# Patient Record
Sex: MALE | Race: WHITE
[De-identification: names, ages, dates, MRNs, and addresses within clinical notes are randomized per-mention and may not be internally consistent; named-entity substitution may affect disease eponyms.]

---

## 2022-06-30 ENCOUNTER — HOSPITAL ENCOUNTER (EMERGENCY)
Dept: HOSPITAL 75 - ER FS | Age: 57
Discharge: HOME | End: 2022-06-30
Payer: COMMERCIAL

## 2022-06-30 VITALS — SYSTOLIC BLOOD PRESSURE: 164 MMHG | DIASTOLIC BLOOD PRESSURE: 81 MMHG

## 2022-06-30 VITALS — HEIGHT: 70 IN | BODY MASS INDEX: 31.47 KG/M2 | WEIGHT: 219.8 LBS

## 2022-06-30 DIAGNOSIS — F43.0: Primary | ICD-10-CM

## 2022-06-30 DIAGNOSIS — Z20.822: ICD-10-CM

## 2022-06-30 LAB
ALBUMIN SERPL-MCNC: 4.6 GM/DL (ref 3.2–4.5)
ALP SERPL-CCNC: 41 U/L (ref 40–136)
ALT SERPL-CCNC: 57 U/L (ref 0–55)
APTT BLD: 28 SEC (ref 24–35)
BASOPHILS # BLD AUTO: 0 10^3/UL (ref 0–0.1)
BASOPHILS NFR BLD AUTO: 1 % (ref 0–10)
BILIRUB SERPL-MCNC: 0.5 MG/DL (ref 0.1–1)
BUN/CREAT SERPL: 10
CALCIUM SERPL-MCNC: 9.1 MG/DL (ref 8.5–10.1)
CHLORIDE SERPL-SCNC: 101 MMOL/L (ref 98–107)
CO2 SERPL-SCNC: 21 MMOL/L (ref 21–32)
CREAT SERPL-MCNC: 0.87 MG/DL (ref 0.6–1.3)
EOSINOPHIL # BLD AUTO: 0 10^3/UL (ref 0–0.3)
EOSINOPHIL NFR BLD AUTO: 0 % (ref 0–10)
GFR SERPLBLD BASED ON 1.73 SQ M-ARVRAT: 99 ML/MIN
GLUCOSE SERPL-MCNC: 142 MG/DL (ref 70–105)
HCT VFR BLD CALC: 42 % (ref 40–54)
HGB BLD-MCNC: 14.5 G/DL (ref 13.3–17.7)
INR PPP: 0.9 (ref 0.8–1.4)
LIPASE SERPL-CCNC: 59 U/L (ref 8–78)
LYMPHOCYTES # BLD AUTO: 2.1 10^3/UL (ref 1–4)
LYMPHOCYTES NFR BLD AUTO: 26 % (ref 12–44)
MAGNESIUM SERPL-MCNC: 2 MG/DL (ref 1.6–2.4)
MANUAL DIFFERENTIAL PERFORMED BLD QL: NO
MCH RBC QN AUTO: 31 PG (ref 25–34)
MCHC RBC AUTO-ENTMCNC: 35 G/DL (ref 32–36)
MCV RBC AUTO: 89 FL (ref 80–99)
MONOCYTES # BLD AUTO: 0.5 10^3/UL (ref 0–1)
MONOCYTES NFR BLD AUTO: 7 % (ref 0–12)
NEUTROPHILS # BLD AUTO: 5.3 10^3/UL (ref 1.8–7.8)
NEUTROPHILS NFR BLD AUTO: 66 % (ref 42–75)
PLATELET # BLD: 297 10^3/UL (ref 130–400)
PMV BLD AUTO: 9.7 FL (ref 9–12.2)
POTASSIUM SERPL-SCNC: 3.8 MMOL/L (ref 3.6–5)
PROT SERPL-MCNC: 7.6 GM/DL (ref 6.4–8.2)
PROTHROMBIN TIME: 12.7 SEC (ref 12.2–14.7)
SODIUM SERPL-SCNC: 142 MMOL/L (ref 135–145)
WBC # BLD AUTO: 8 10^3/UL (ref 4.3–11)

## 2022-06-30 PROCEDURE — 83735 ASSAY OF MAGNESIUM: CPT

## 2022-06-30 PROCEDURE — 93041 RHYTHM ECG TRACING: CPT

## 2022-06-30 PROCEDURE — 85730 THROMBOPLASTIN TIME PARTIAL: CPT

## 2022-06-30 PROCEDURE — 84484 ASSAY OF TROPONIN QUANT: CPT

## 2022-06-30 PROCEDURE — 87636 SARSCOV2 & INF A&B AMP PRB: CPT

## 2022-06-30 PROCEDURE — 36415 COLL VENOUS BLD VENIPUNCTURE: CPT

## 2022-06-30 PROCEDURE — 93005 ELECTROCARDIOGRAM TRACING: CPT

## 2022-06-30 PROCEDURE — 83874 ASSAY OF MYOGLOBIN: CPT

## 2022-06-30 PROCEDURE — 83690 ASSAY OF LIPASE: CPT

## 2022-06-30 PROCEDURE — 85025 COMPLETE CBC W/AUTO DIFF WBC: CPT

## 2022-06-30 PROCEDURE — 85610 PROTHROMBIN TIME: CPT

## 2022-06-30 PROCEDURE — 71045 X-RAY EXAM CHEST 1 VIEW: CPT

## 2022-06-30 PROCEDURE — 80053 COMPREHEN METABOLIC PANEL: CPT

## 2022-06-30 PROCEDURE — 83880 ASSAY OF NATRIURETIC PEPTIDE: CPT

## 2022-06-30 NOTE — DIAGNOSTIC IMAGING REPORT
INDICATION: Chest pain.



FINDINGS: Portable chest. The lungs are clear. Heart is not

enlarged. No pneumothorax or pleural effusion. No bony

abnormalities.



IMPRESSION: Normal portable chest.



Dictated by: 



  Dictated on workstation # [id number]

## 2022-06-30 NOTE — ED CHEST PAIN
General


Chief Complaint:  Chest Pain


Stated Complaint:  CHEST PAIN





History of Present Illness


Date Seen by Provider:  Jun 30, 2022


Time Seen by Provider:  07:12


Initial Comments


56 yo male presents with mild right sided chest pain/pressure.  pt reports sta

rted around 2am this morning.  pt reports that normally he walks 3 miles daily 

after work but over the last couple days, just hasnt felt good and been able to 

do it.  pt with mild cough, mild sob.   denies 

nausea/fever/chills/diarrhea/vomiting, radiation of pain or diaphoresis





Allergies and Home Medications


Allergies


Coded Allergies:  


     No Known Drug Allergies (Unverified , 6/30/22)





Patient Home Medication List


Home Medication List Reviewed:  Yes





Review of Systems


Review of Systems


Constitutional:  No chills, No diaphoresis, No dizziness, No fever


Respiratory:  Cough, SOA With Exertion


Cardiovascular:  Chest Pain (right sided )


Gastrointestinal:  Denies Abdominal Pain, Denies Diarrhea, Denies Nausea, Denies

Vomiting


Genitourinary:  No Symptoms Reported


Musculoskeletal:  no symptoms reported


Skin:  no symptoms reported


Psychiatric/Neurological:  No Symptoms Reported


Endocrine:  No Symptoms Reported





Physical Exam


Vital Signs





Vital Signs - First Documented








 6/30/22





 07:18


 


Temp 36.1


 


Pulse 84


 


Resp 20


 


B/P (MAP) 158/90 (112)


 


Pulse Ox 95


 


O2 Delivery Room Air





Capillary Refill :


Height, Weight, BMI


Height: '"


Weight: lbs. oz. kg;  BMI


Method:


General Appearance:  No Apparent Distress, WD/WN


HEENT:  PERRL/EOMI, Moist Mucous Membranes


Neck:  Non Tender, Supple


Respiratory:  Lungs Clear, Normal Breath Sounds


Cardiovascular:  Regular Rate, Rhythm, No Edema


Gastrointestinal:  Non Tender, Soft


Extremity:  Normal Range of Motion, Non Tender


Neurologic/Psychiatric:  Oriented x3, No Motor/Sensory Deficits, Normal 

Mood/Affect, CNs II-XII Norm as Tested


Skin:  Normal Color, Warm/Dry





Progress/Results/Core Measures


Results/Orders


Lab Results





Laboratory Tests








Test


 6/30/22


07:07 6/30/22


07:30 Range/Units


 


 


White Blood Count


 8.0 


 


 4.3-11.0


10^3/uL


 


Red Blood Count


 4.74 


 


 4.30-5.52


10^6/uL


 


Hemoglobin 14.5   13.3-17.7  g/dL


 


Hematocrit 42   40-54  %


 


Mean Corpuscular Volume 89   80-99  fL


 


Mean Corpuscular Hemoglobin 31   25-34  pg


 


Mean Corpuscular Hemoglobin


Concent 35 


 


 32-36  g/dL





 


Red Cell Distribution Width 13.1   10.0-14.5  %


 


Platelet Count


 297 


 


 130-400


10^3/uL


 


Mean Platelet Volume 9.7   9.0-12.2  fL


 


Immature Granulocyte % (Auto) 0    %


 


Neutrophils (%) (Auto) 66   42-75  %


 


Lymphocytes (%) (Auto) 26   12-44  %


 


Monocytes (%) (Auto) 7   0-12  %


 


Eosinophils (%) (Auto) 0   0-10  %


 


Basophils (%) (Auto) 1   0-10  %


 


Neutrophils # (Auto)


 5.3 


 


 1.8-7.8


10^3/uL


 


Lymphocytes # (Auto)


 2.1 


 


 1.0-4.0


10^3/uL


 


Monocytes # (Auto)


 0.5 


 


 0.0-1.0


10^3/uL


 


Eosinophils # (Auto)


 0.0 


 


 0.0-0.3


10^3/uL


 


Basophils # (Auto)


 0.0 


 


 0.0-0.1


10^3/uL


 


Immature Granulocyte # (Auto)


 0.0 


 


 0.0-0.1


10^3/uL


 


Prothrombin Time 12.7   12.2-14.7  SEC


 


INR Comment 0.9   0.8-1.4  


 


Activated Partial


Thromboplast Time 28 


 


 24-35  SEC





 


Sodium Level 142   135-145  MMOL/L


 


Potassium Level 3.8   3.6-5.0  MMOL/L


 


Chloride Level 101     MMOL/L


 


Carbon Dioxide Level 21   21-32  MMOL/L


 


Anion Gap 20 H  5-14  MMOL/L


 


Blood Urea Nitrogen 9   7-18  MG/DL


 


Creatinine


 0.87 


 


 0.60-1.30


MG/DL


 


Estimat Glomerular Filtration


Rate 99 


 


  





 


BUN/Creatinine Ratio 10    


 


Glucose Level 142 H    MG/DL


 


Calcium Level 9.1   8.5-10.1  MG/DL


 


Corrected Calcium    8.5-10.1  MG/DL


 


Magnesium Level 2.0   1.6-2.4  MG/DL


 


Total Bilirubin 0.5   0.1-1.0  MG/DL


 


Aspartate Amino Transf


(AST/SGOT) 62 H


 


 5-34  U/L





 


Alanine Aminotransferase


(ALT/SGPT) 57 H


 


 0-55  U/L





 


Alkaline Phosphatase 41     U/L


 


Myoglobin


 < 21.0 


 


 10.0-92.0


NG/ML


 


Troponin I < 0.30   <0.30  NG/ML


 


Pro-B-Type Natriuretic Peptide 27.1   <75.0  PG/ML


 


Total Protein 7.6   6.4-8.2  GM/DL


 


Albumin 4.6 H  3.2-4.5  GM/DL


 


Lipase 59   8-78  U/L


 


SARS-CoV-2 RNA (RT-PCR)  Not Detected  Not Detecte  








My Orders





Orders - NOLAN,EDINSON L DO


Cbc With Automated Diff (6/30/22 07:12)


Magnesium (6/30/22 07:12)


Chest 1 View Ap/Pa Only (6/30/22 07:12)


Ekg Tracing (6/30/22 07:12)


Comprehensive Metabolic Panel (6/30/22 07:12)


Myoglobin Serum (6/30/22 07:12)


Protime With Inr (6/30/22 07:12)


Partial Thromboplastin Time (6/30/22 07:12)


Monitor-Rhythm Ecg Trace Only (6/30/22 07:12)


Lipid Panel (7/1/22 06:00)


Aspirin Chewable Tablet (Baby Aspirin Ch (6/30/22 07:15)


Ed Iv/Invasive Line Start (6/30/22 07:12)


Lipase (6/30/22 07:12)


Troponin I Fs (6/30/22 07:12)


Probnp Fs (6/30/22 07:12)


Covid 19 Inhouse Test (6/30/22 07:12)





Medications Given in ED





Current Medications








 Medications  Dose


 Ordered  Sig/Matt


 Route  Start Time


 Stop Time Status Last Admin


Dose Admin


 


 Aspirin  324 mg  ONCE  ONCE


 PO  6/30/22 07:15


 6/30/22 07:16 DC 6/30/22 07:27


324 MG








Vital Signs/I&O











 6/30/22





 07:18


 


Temp 36.1


 


Pulse 84


 


Resp 20


 


B/P (MAP) 158/90 (112)


 


Pulse Ox 95


 


O2 Delivery Room Air











Progress


Progress Note :  


Progress Note


Patient with negative EKG, troponin, chest x-ray.  Patient admits to being under

a significant amount of stress to start about 3 days ago due to family issue.  

Reports that that is when all his pain and everything started.  It is spent 

quite a time visit with him as he is also having some difficulty sleeping due to

this.  I suspect that his symptoms are related to situational stress.  This time

is been over 6 hours since his discomfort started with a negative troponin 

negative EKG shows very unlikely coronary syndrome.  Patient will reach out to 

his primary care provider or mental health or [employer] if he feels like he needs 

some further help with his family situation.  Patient stable and discharged home





Diagnostic Imaging





   Diagonstic Imaging:  Xray


   Plain Films/CT/US/NM/MRI:  chest


Comments


Date of Exam:06/30/22





CHEST 1 VIEW AP/PA ONLY








INDICATION: Chest pain.





FINDINGS: Portable chest. The lungs are clear. Heart is not


enlarged. No pneumothorax or pleural effusion. No bony


abnormalities.





IMPRESSION: Normal portable chest.


   Reviewed:  Reviewed by Me, Reviewed/Discussed





Departure


Impression





   Primary Impression:  


   Acute reaction to situational stress


Disposition:  01 HOME, SELF-CARE


Condition:  Stable





Departure-Patient Inst.


Patient Instructions:  Stress, Chest Pain That Is Not Caused by the Heart (DC), 

Tips on Positive Thinking





Add. Discharge Instructions:  


Please follow-up with your primary care provider or mental health if you need 

further outpatient help


Return to the ER with any concerns


You may try 50 of Benadryl at night to help with your sleep or over-the-counter 

melatonin





All discharge instructions reviewed with patient and/or family. Voiced 

understanding.











EDINSON NOLAN DO               Jun 30, 2022 07:12

## 2022-07-08 ENCOUNTER — HOSPITAL ENCOUNTER (EMERGENCY)
Dept: HOSPITAL 75 - ER FS | Age: 57
Discharge: HOME | End: 2022-07-08
Payer: COMMERCIAL

## 2022-07-08 VITALS — HEIGHT: 65.75 IN | WEIGHT: 220.46 LBS | BODY MASS INDEX: 35.86 KG/M2

## 2022-07-08 VITALS — DIASTOLIC BLOOD PRESSURE: 71 MMHG | SYSTOLIC BLOOD PRESSURE: 133 MMHG

## 2022-07-08 DIAGNOSIS — F41.1: Primary | ICD-10-CM

## 2022-07-08 DIAGNOSIS — R55: ICD-10-CM

## 2022-07-08 LAB
ALBUMIN SERPL-MCNC: 4.3 GM/DL (ref 3.2–4.5)
ALP SERPL-CCNC: 37 U/L (ref 40–136)
ALT SERPL-CCNC: 65 U/L (ref 0–55)
BASOPHILS # BLD AUTO: 0 10^3/UL (ref 0–0.1)
BASOPHILS NFR BLD AUTO: 1 % (ref 0–10)
BILIRUB SERPL-MCNC: 0.4 MG/DL (ref 0.1–1)
BUN/CREAT SERPL: 8
CALCIUM SERPL-MCNC: 8.5 MG/DL (ref 8.5–10.1)
CHLORIDE SERPL-SCNC: 100 MMOL/L (ref 98–107)
CO2 SERPL-SCNC: 24 MMOL/L (ref 21–32)
CREAT SERPL-MCNC: 0.73 MG/DL (ref 0.6–1.3)
EOSINOPHIL # BLD AUTO: 0.1 10^3/UL (ref 0–0.3)
EOSINOPHIL NFR BLD AUTO: 3 % (ref 0–10)
GFR SERPLBLD BASED ON 1.73 SQ M-ARVRAT: 105 ML/MIN
GLUCOSE SERPL-MCNC: 198 MG/DL (ref 70–105)
HCT VFR BLD CALC: 39 % (ref 40–54)
HGB BLD-MCNC: 13.4 G/DL (ref 13.3–17.7)
LYMPHOCYTES # BLD AUTO: 1.4 10^3/UL (ref 1–4)
LYMPHOCYTES NFR BLD AUTO: 35 % (ref 12–44)
MANUAL DIFFERENTIAL PERFORMED BLD QL: NO
MCH RBC QN AUTO: 31 PG (ref 25–34)
MCHC RBC AUTO-ENTMCNC: 34 G/DL (ref 32–36)
MCV RBC AUTO: 89 FL (ref 80–99)
MONOCYTES # BLD AUTO: 0.6 10^3/UL (ref 0–1)
MONOCYTES NFR BLD AUTO: 14 % (ref 0–12)
NEUTROPHILS # BLD AUTO: 1.9 10^3/UL (ref 1.8–7.8)
NEUTROPHILS NFR BLD AUTO: 48 % (ref 42–75)
PLATELET # BLD: 134 10^3/UL (ref 130–400)
PMV BLD AUTO: 9.3 FL (ref 9–12.2)
POTASSIUM SERPL-SCNC: 3.3 MMOL/L (ref 3.6–5)
PROT SERPL-MCNC: 7.2 GM/DL (ref 6.4–8.2)
SODIUM SERPL-SCNC: 144 MMOL/L (ref 135–145)
WBC # BLD AUTO: 4 10^3/UL (ref 4.3–11)

## 2022-07-08 PROCEDURE — 93005 ELECTROCARDIOGRAM TRACING: CPT

## 2022-07-08 PROCEDURE — 71045 X-RAY EXAM CHEST 1 VIEW: CPT

## 2022-07-08 PROCEDURE — 80053 COMPREHEN METABOLIC PANEL: CPT

## 2022-07-08 PROCEDURE — 36415 COLL VENOUS BLD VENIPUNCTURE: CPT

## 2022-07-08 PROCEDURE — 83880 ASSAY OF NATRIURETIC PEPTIDE: CPT

## 2022-07-08 PROCEDURE — 84484 ASSAY OF TROPONIN QUANT: CPT

## 2022-07-08 PROCEDURE — 85025 COMPLETE CBC W/AUTO DIFF WBC: CPT

## 2022-07-08 NOTE — DIAGNOSTIC IMAGING REPORT
Portable erect AP chest at 10:00 AM.



INDICATION:  Chest pain 



COMPARISON: 06/30/2022



FINDINGS: The heart size is stable when compared to the prior

exam. The lungs are clear. There is no evidence for failure,

pneumonia or for a pleural effusion. The mediastinum is not

widened. The osseous structures are intact. The deformity of the

proximal left clavicle seen previously is again evident and no

different.



IMPRESSION: There is no evidence for active disease.   



Dictated by: 



  Dictated on workstation # [id number]

## 2022-07-08 NOTE — ED CHEST PAIN
General


Chief Complaint:  Chest Pain


Stated Complaint:  CHEST PAINS


Nursing Triage Note:  


Patient has been brought to ER by EMS with cc of having a syncopal episode while




in the shower.  Patient reports that he had taken his normal 2 mile walk before 


work and while taking a shower he passed out.   He reports that after passing 


out his face was numb and he was having stabbing chest pain.  He reports the 


chest has been ongong for the last week.  He states that he has not been 


sleeping well for the last 6 months.  He reports being seen in the ER last week 


for the chest pain. He has not seen his doctor for the ongoing chest pain.  He 


has been taking aspirin for his pain but it has not helped much.


Source:  patient


Exam Limitations:  no limitations





History of Present Illness


Date Seen by Provider:  Jul 8, 2022


Time Seen by Provider:  10:15


Initial Comments


Patient is a 57-year-old male who presents to the emergency department with 

intermittent parasternal chest pain for the past several days and syncopal 

episode while in the shower.  Patient states he took his blood pressure this 

morning and then went on his usual 2 mile walk.  He denies experiencing chest 

pain during the walk but reports feeling anxious and feeling numbness around his

face.  He then proceeded to take a shower and had a brief syncopal episode with 

no LOC for few seconds.  He denies hitting his head, headache, palpitations or 

chest pain prior to episode.  After passing out patient reports numbness and 

sharp chest pain.  Patient was given nitroglycerin and aspirin by EMS.  Chest 

pain is subsided but he still remains anxious.  He denies history of coronary 

disease, arrhythmia or exertional chest pain.  He states he is under significant

stress related to a close family member who struggles with addiction.  Denies 

drugs or alcohol and is a non-smoker.  No other acute symptoms or complaints.


Timing/Duration:  1 hour


Severity/Quality:  mild


Location:  substernal, central


Radiation:  other


Prior CP/Workup:  other


Modifying Factors:  improves with other





Allergies and Home Medications


Allergies


Coded Allergies:  


     No Known Drug Allergies (Unverified , 6/30/22)





Patient Home Medication List


Home Medication List Reviewed:  Yes





Review of Systems


Review of Systems


Constitutional:  see HPI


EENTM:  See HPI


Respiratory:  See HPI


Cardiovascular:  See HPI


Gastrointestinal:  See HPI


Genitourinary:  See HPI


Musculoskeletal:  see HPI


Skin:  see HPI


Psychiatric/Neurological:  See HPI


Endocrine:  See HPI


Hematologic/Lymphatic:  See HPI





All Other Systems Reviewed


Negative Unless Noted:  Yes





Past Medical-Social-Family Hx


Patient Social History


Tobacco Use?:  No


Substance use?:  No


Alcohol Use?:  Yes


Alcohol Frequency:  Once in a while


Pt feels they are or have been:  No





Past Medical History


Surgery/Hospitalization HX:  


HTN; Depression





Physical Exam


Vital Signs





Vital Signs - First Documented








 7/8/22





 09:57


 


Temp 36.4


 


Pulse 86


 


Resp 16


 


B/P (MAP) 134/79 (97)


 


Pulse Ox 95


 


O2 Delivery Room Air





Capillary Refill :


Height, Weight, BMI


Height: '"


Weight: lbs. oz. kg; 35.00 BMI


Method:


General Appearance:  No Apparent Distress, WD/WN, Anxious


HEENT:  PERRL/EOMI, TMs Normal


Neck:  Full Range of Motion, Normal Inspection


Respiratory:  Lungs Clear, Normal Breath Sounds


Cardiovascular:  Regular Rate, Rhythm, Normal Peripheral Pulses


Gastrointestinal:  Non Tender, Soft


Neurologic/Psychiatric:  Alert, Oriented x3, No Motor/Sensory Deficits, Normal 

Mood/Affect, CNs II-XII Norm as Tested





Focused Exam


Sepsis Stage:  Ruled Out





Progress/Results/Core Measures


Results/Orders


Lab Results





Laboratory Tests








Test


 7/8/22


09:48 7/8/22


11:41 Range/Units


 


 


White Blood Count


 4.0 L


 


 4.3-11.0


10^3/uL


 


Red Blood Count


 4.40 


 


 4.30-5.52


10^6/uL


 


Hemoglobin 13.4   13.3-17.7  g/dL


 


Hematocrit 39 L  40-54  %


 


Mean Corpuscular Volume 89   80-99  fL


 


Mean Corpuscular Hemoglobin 31   25-34  pg


 


Mean Corpuscular Hemoglobin


Concent 34 


 


 32-36  g/dL





 


Red Cell Distribution Width 13.8   10.0-14.5  %


 


Platelet Count


 134 


 


 130-400


10^3/uL


 


Mean Platelet Volume 9.3   9.0-12.2  fL


 


Immature Granulocyte % (Auto) 1    %


 


Neutrophils (%) (Auto) 48   42-75  %


 


Lymphocytes (%) (Auto) 35   12-44  %


 


Monocytes (%) (Auto) 14 H  0-12  %


 


Eosinophils (%) (Auto) 3   0-10  %


 


Basophils (%) (Auto) 1   0-10  %


 


Neutrophils # (Auto)


 1.9 


 


 1.8-7.8


10^3/uL


 


Lymphocytes # (Auto)


 1.4 


 


 1.0-4.0


10^3/uL


 


Monocytes # (Auto)


 0.6 


 


 0.0-1.0


10^3/uL


 


Eosinophils # (Auto)


 0.1 


 


 0.0-0.3


10^3/uL


 


Basophils # (Auto)


 0.0 


 


 0.0-0.1


10^3/uL


 


Immature Granulocyte # (Auto)


 0.0 


 


 0.0-0.1


10^3/uL


 


Sodium Level 144   135-145  MMOL/L


 


Potassium Level 3.3 L  3.6-5.0  MMOL/L


 


Chloride Level 100     MMOL/L


 


Carbon Dioxide Level 24   21-32  MMOL/L


 


Anion Gap 20 H  5-14  MMOL/L


 


Blood Urea Nitrogen 6 L  7-18  MG/DL


 


Creatinine


 0.73 


 


 0.60-1.30


MG/DL


 


Estimat Glomerular Filtration


Rate 105 


 


  





 


BUN/Creatinine Ratio 8    


 


Glucose Level 198 H    MG/DL


 


Calcium Level 8.5   8.5-10.1  MG/DL


 


Corrected Calcium 8.3 L  8.5-10.1  MG/DL


 


Total Bilirubin 0.4   0.1-1.0  MG/DL


 


Aspartate Amino Transf


(AST/SGOT) 51 H


 


 5-34  U/L





 


Alanine Aminotransferase


(ALT/SGPT) 65 H


 


 0-55  U/L





 


Alkaline Phosphatase 37 L    U/L


 


Troponin I < 0.30  < 0.30  <0.30  NG/ML


 


Pro-B-Type Natriuretic Peptide < 5.0   <125.0  PG/ML


 


Total Protein 7.2   6.4-8.2  GM/DL


 


Albumin 4.3   3.2-4.5  GM/DL








My Orders





Orders - TAMMIE TURNER DO


Cbc With Automated Diff (7/8/22 09:57)


Comprehensive Metabolic Panel (7/8/22 09:57)


Ekg Tracing (7/8/22 09:57)


Chest 1 View Ap/Pa Only (7/8/22 09:57)


Troponin I Fs (7/8/22 09:57)


Lorazepam Injection (Ativan Injection) (7/8/22 10:15)


Ns Iv 1000 Ml (Sodium Chloride 0.9%) (7/8/22 10:15)


Probnp Fs (7/8/22 09:57)


Troponin I Fs (7/8/22 11:09)





Medications Given in ED





Current Medications








 Medications  Dose


 Ordered  Sig/Matt


 Route  Start Time


 Stop Time Status Last Admin


Dose Admin


 


 Lorazepam  1 mg  ONCE  ONCE


 IVP  7/8/22 10:15


 7/8/22 10:16 DC 7/8/22 10:17


1 MG








Vital Signs/I&O











 7/8/22





 09:57


 


Temp 36.4


 


Pulse 86


 


Resp 16


 


B/P (MAP) 134/79 (97)


 


Pulse Ox 95


 


O2 Delivery Room Air














Blood Pressure Mean:                    97











Departure


Communication (Admissions)


Chest x-ray: No acute cardiopulmonary disease per radiology report.


EKG: Sinus rhythm, no acute ST-T wave changes.





Patient with atypical chest pain starting after a brief syncopal episode in the 

shower.  Chest pain is consistent with anxiety with perioral numbness.  Patient 

is under increased anxiety and has had difficulty sleeping the past few days.  

He denies exertional chest pain palpitations or shortness of breath.  Repeat 

troponin and EKG are nonacute.  Patient did receive Ativan with resolution of 

his facial numbness in the ED.  He is currently symptom-free.  Syncope is likely

multifactorial related to mild dehydration, vasodilation from heat exposure 

exacerbated while taking a shower in addition to his home blood pressure 

medication.  Vital signs are stable in the ED.  Comfortable discharging the 

patient home with anxiety medications with instructions to follow-up with his 

PCP for additional outpatient screening and for community mental health 

services.  This discharge plan discussed with the patient in detail who is 

comfortable with recommendations.  Return precautions reviewed.  Patient 

verbalizes understanding agreement discharge instructions prior to departure.





Impression





   Primary Impression:  


   Anxiety state


   Additional Impressions:  


   Chest pain


   Syncope


Disposition:  01 HOME, SELF-CARE


Condition:  Stable





Departure-Patient Inst.


Decision time for Depature:  12:15


Referrals:  


NO,LOCAL PHYSICIAN (PCP)


Primary Care Physician


Patient Instructions:  Anxiety, Adult ED, Syncope (Fainting) (DC), Chest Pain, 

Adult ED





Add. Discharge Instructions:  


You were evaluated in the emergency department for fainting spell, chest pain 

and anxiety.  The exact cause of your symptoms has not been determined.  Please 

avoid taking blood pressure medication prior to exercise and shower, and 

increase daily fluid intake.  Hold daily blood pressure medication if you feel 

dizzy or lightheaded and take newly prescribed anxiety medication as directed.  

Follow-up with your PCP for reevaluation and coordination of further outpatient 

testing.  Do not take anxiety medication if driving or performing any potential 

dangerous activity.  Return to the ED if new or worsening symptoms.





All discharge instructions reviewed with patient and/or family. Voiced 

understanding.


Scripts


Hydroxyzine HCl (Hydroxyzine HCl) 50 Mg Tablet


50 MG PO Q6H, #10 TAB


   Prov: TAMMIE TURNER DO         7/8/22











TAMMIE TURNER DO                    Jul 8, 2022 10:36

## 2024-07-11 ENCOUNTER — APPOINTMENT (OUTPATIENT)
Facility: HOSPITAL | Age: 59
End: 2024-07-11
Payer: COMMERCIAL

## 2024-07-11 ENCOUNTER — HOSPITAL ENCOUNTER (EMERGENCY)
Facility: HOSPITAL | Age: 59
Discharge: HOME | End: 2024-07-11
Attending: STUDENT IN AN ORGANIZED HEALTH CARE EDUCATION/TRAINING PROGRAM
Payer: COMMERCIAL

## 2024-07-11 VITALS
OXYGEN SATURATION: 98 % | DIASTOLIC BLOOD PRESSURE: 79 MMHG | HEIGHT: 71 IN | BODY MASS INDEX: 30.1 KG/M2 | HEART RATE: 90 BPM | RESPIRATION RATE: 18 BRPM | WEIGHT: 215 LBS | TEMPERATURE: 98.7 F | SYSTOLIC BLOOD PRESSURE: 150 MMHG

## 2024-07-11 DIAGNOSIS — R07.9 CHEST PAIN, UNSPECIFIED TYPE: Primary | ICD-10-CM

## 2024-07-11 LAB
ALBUMIN SERPL-MCNC: 4 G/DL (ref 3.5–5)
ALBUMIN/GLOB SERPL: 1.3 (ref 1.1–2.2)
ALP SERPL-CCNC: 44 U/L (ref 45–117)
ALT SERPL-CCNC: 74 U/L (ref 12–78)
ANION GAP SERPL CALC-SCNC: 10 MMOL/L (ref 5–15)
AST SERPL W P-5'-P-CCNC: 78 U/L (ref 15–37)
BASOPHILS # BLD: 0 K/UL (ref 0–0.1)
BASOPHILS NFR BLD: 0 % (ref 0–1)
BILIRUB SERPL-MCNC: 1.4 MG/DL (ref 0.2–1)
BUN SERPL-MCNC: 19 MG/DL (ref 6–20)
BUN/CREAT SERPL: 15 (ref 12–20)
CA-I BLD-MCNC: 8.6 MG/DL (ref 8.5–10.1)
CHLORIDE SERPL-SCNC: 100 MMOL/L (ref 97–108)
CO2 SERPL-SCNC: 29 MMOL/L (ref 21–32)
CREAT SERPL-MCNC: 1.23 MG/DL (ref 0.7–1.3)
DIFFERENTIAL METHOD BLD: ABNORMAL
EKG ATRIAL RATE: 87 BPM
EKG DIAGNOSIS: NORMAL
EKG P AXIS: 27 DEGREES
EKG P-R INTERVAL: 126 MS
EKG Q-T INTERVAL: 390 MS
EKG QRS DURATION: 106 MS
EKG QTC CALCULATION (BAZETT): 469 MS
EKG R AXIS: 17 DEGREES
EKG T AXIS: 15 DEGREES
EKG VENTRICULAR RATE: 87 BPM
EOSINOPHIL # BLD: 0 K/UL (ref 0–0.4)
EOSINOPHIL NFR BLD: 0 % (ref 0–7)
ERYTHROCYTE [DISTWIDTH] IN BLOOD BY AUTOMATED COUNT: 13 % (ref 11.5–14.5)
GLOBULIN SER CALC-MCNC: 3.2 G/DL (ref 2–4)
GLUCOSE SERPL-MCNC: 153 MG/DL (ref 65–100)
HCT VFR BLD AUTO: 40.3 % (ref 36.6–50.3)
HGB BLD-MCNC: 14.3 G/DL (ref 12.1–17)
IMM GRANULOCYTES # BLD AUTO: 0.1 K/UL (ref 0–0.04)
IMM GRANULOCYTES NFR BLD AUTO: 1 % (ref 0–0.5)
LYMPHOCYTES # BLD: 1.4 K/UL (ref 0.8–3.5)
LYMPHOCYTES NFR BLD: 13 % (ref 12–49)
MCH RBC QN AUTO: 31 PG (ref 26–34)
MCHC RBC AUTO-ENTMCNC: 35.5 G/DL (ref 30–36.5)
MCV RBC AUTO: 87.4 FL (ref 80–99)
MONOCYTES # BLD: 1.1 K/UL (ref 0–1)
MONOCYTES NFR BLD: 10 % (ref 5–13)
NEUTS SEG # BLD: 8.9 K/UL (ref 1.8–8)
NEUTS SEG NFR BLD: 76 % (ref 32–75)
NRBC # BLD: 0 K/UL (ref 0–0.01)
NRBC BLD-RTO: 0 PER 100 WBC
PLATELET # BLD AUTO: 182 K/UL (ref 150–400)
PMV BLD AUTO: 10.1 FL (ref 8.9–12.9)
POTASSIUM SERPL-SCNC: 3.2 MMOL/L (ref 3.5–5.1)
PROT SERPL-MCNC: 7.2 G/DL (ref 6.4–8.2)
RBC # BLD AUTO: 4.61 M/UL (ref 4.1–5.7)
SODIUM SERPL-SCNC: 139 MMOL/L (ref 136–145)
TROPONIN I SERPL HS-MCNC: 13 NG/L (ref 0–76)
WBC # BLD AUTO: 11.5 K/UL (ref 4.1–11.1)

## 2024-07-11 PROCEDURE — 99285 EMERGENCY DEPT VISIT HI MDM: CPT

## 2024-07-11 PROCEDURE — 84484 ASSAY OF TROPONIN QUANT: CPT

## 2024-07-11 PROCEDURE — 71046 X-RAY EXAM CHEST 2 VIEWS: CPT

## 2024-07-11 PROCEDURE — 96375 TX/PRO/DX INJ NEW DRUG ADDON: CPT

## 2024-07-11 PROCEDURE — 80053 COMPREHEN METABOLIC PANEL: CPT

## 2024-07-11 PROCEDURE — 93005 ELECTROCARDIOGRAM TRACING: CPT | Performed by: STUDENT IN AN ORGANIZED HEALTH CARE EDUCATION/TRAINING PROGRAM

## 2024-07-11 PROCEDURE — 36415 COLL VENOUS BLD VENIPUNCTURE: CPT

## 2024-07-11 PROCEDURE — 85025 COMPLETE CBC W/AUTO DIFF WBC: CPT

## 2024-07-11 PROCEDURE — 96374 THER/PROPH/DIAG INJ IV PUSH: CPT

## 2024-07-11 PROCEDURE — 6360000002 HC RX W HCPCS: Performed by: STUDENT IN AN ORGANIZED HEALTH CARE EDUCATION/TRAINING PROGRAM

## 2024-07-11 PROCEDURE — 6370000000 HC RX 637 (ALT 250 FOR IP): Performed by: STUDENT IN AN ORGANIZED HEALTH CARE EDUCATION/TRAINING PROGRAM

## 2024-07-11 RX ORDER — KETOROLAC TROMETHAMINE 15 MG/ML
15 INJECTION, SOLUTION INTRAMUSCULAR; INTRAVENOUS
Status: COMPLETED | OUTPATIENT
Start: 2024-07-11 | End: 2024-07-11

## 2024-07-11 RX ORDER — KETOROLAC TROMETHAMINE 10 MG/1
10 TABLET, FILM COATED ORAL EVERY 6 HOURS PRN
Qty: 20 TABLET | Refills: 0 | Status: SHIPPED | OUTPATIENT
Start: 2024-07-11

## 2024-07-11 RX ORDER — LORAZEPAM 2 MG/ML
1 INJECTION INTRAMUSCULAR ONCE
Status: DISCONTINUED | OUTPATIENT
Start: 2024-07-11 | End: 2024-07-11

## 2024-07-11 RX ORDER — ONDANSETRON 2 MG/ML
4 INJECTION INTRAMUSCULAR; INTRAVENOUS EVERY 6 HOURS PRN
Status: DISCONTINUED | OUTPATIENT
Start: 2024-07-11 | End: 2024-07-11 | Stop reason: HOSPADM

## 2024-07-11 RX ORDER — LORAZEPAM 1 MG/1
1 TABLET ORAL EVERY 4 HOURS PRN
Status: DISCONTINUED | OUTPATIENT
Start: 2024-07-11 | End: 2024-07-11 | Stop reason: HOSPADM

## 2024-07-11 RX ADMIN — LORAZEPAM 1 MG: 1 TABLET ORAL at 03:50

## 2024-07-11 RX ADMIN — KETOROLAC TROMETHAMINE 15 MG: 15 INJECTION, SOLUTION INTRAMUSCULAR; INTRAVENOUS at 02:25

## 2024-07-11 RX ADMIN — ONDANSETRON 4 MG: 2 INJECTION INTRAMUSCULAR; INTRAVENOUS at 03:50

## 2024-07-11 ASSESSMENT — PAIN SCALES - GENERAL
PAINLEVEL_OUTOF10: 3
PAINLEVEL_OUTOF10: 6

## 2024-07-11 ASSESSMENT — PAIN - FUNCTIONAL ASSESSMENT: PAIN_FUNCTIONAL_ASSESSMENT: 0-10

## 2024-07-11 ASSESSMENT — PAIN DESCRIPTION - LOCATION: LOCATION: CHEST

## 2024-07-11 NOTE — ED NOTES
Assumed care of patient. Patient sitting up in chair. No acute distress noted. Patient complaining of mild chest and back pain, denies SOB.

## 2024-07-11 NOTE — ED PROVIDER NOTES
[facility] EMERGENCY DEPT  EMERGENCY DEPARTMENT HISTORY AND PHYSICAL EXAM      Date: 7/11/2024  Patient Name: Marcin Alejandre  MRN: [id number]  Birthdate 1965  Date of evaluation: 7/11/2024  Provider: Juan Cortés MD   Note Started: 2:22 AM EDT 7/11/24    HISTORY OF PRESENT ILLNESS     Chief Complaint   Patient presents with    Chest Pain    Emesis       History Provided By: Patient    HPI: Marcin Alejandre is a 59 y.o. male PMH HTN presenting with chest pain that started tonight and nausea vomiting for the last 2 days.  Patient reports midepigastric pain which is stabbing in nature does not radiate.  No history of heart disease or MI.  No recent abdominal surgeries.  Patient does report previous cholecystectomy.  Patient was given aspirin full dose with EMS and 4 mg of Zofran.  He still reports excepted pain and mild nausea.  He denies any similar chest pain in the past.    PAST MEDICAL HISTORY   Past Medical History:  Past Medical History:   Diagnosis Date    Hypertension        Past Surgical History:  History reviewed. No pertinent surgical history.    Family History:  History reviewed. No pertinent family history.    Social History:  Social History     Tobacco Use    Smoking status: Never    Smokeless tobacco: Never   Substance Use Topics    Alcohol use: Yes     Comment: socially    Drug use: Not Currently       Allergies:  Not on File    PCP: Neda Aden MD    Current Meds:   Current Facility-Administered Medications   Medication Dose Route Frequency Provider Last Rate Last Admin    ondansetron (ZOFRAN) injection 4 mg  4 mg IntraVENous Q6H PRN Juan Cortés MD   4 mg at 07/11/24 0350    LORazepam (ATIVAN) tablet 1 mg  1 mg Oral Q4H PRN Juan Cortés MD   1 mg at 07/11/24 0350     Current Outpatient Medications   Medication Sig Dispense Refill    ketorolac (TORADOL) 10 MG tablet Take 1 tablet by mouth every 6 hours as needed for Pain 20 tablet 0       Social Determinants of Health:   Social

## 2024-07-11 NOTE — ED TRIAGE NOTES
Pt arrives via EMS from home for vomiting x2 days and chest pain that started tonight. EMS gave 324 aspirin and 4 zofran.

## 2024-07-11 NOTE — DISCHARGE INSTRUCTIONS
It was a please treating you today Marcin Alejandre. You were evaluated for chest pain in the Emergency Department today. Your lab work (cardiac enzymes), chest x-ray, and EKG were within normal limits. There was no evidence you were having an acute cardiac event today (such as a heart attack), but I would like you to follow up with cardiology for further evaluation. Please schedule an appointment to be seen in the next week for re-evaluation and continued care. Some forms of early heart disease would not be detected on your visit today and you may require a stress test for further evaluation if you have not had one before. A cardiologist can also discuss the best heart, blood pressure, and cholestrol medication to be on to help reduce your risk factors for any cardiac events. While your work up was reassuring today, you may still have a cardiac event in the future. If you have any worsening chest pain, chest pressure, shortness of breath, difficulty breathing, or any concern you may be having a heart attack you must return to the Emergency Department or call 911 immediately.         Thank you for choosing our Emergency Department for your care.  It is our privilege to care for you in your time of need.  In the next several days, you may receive a survey via email or mailed to your home about your experience with our team.  We would greatly appreciate you taking a few minutes to complete the survey, as we use this information to learn what we have done well and what we could be doing better. Thank you for trusting us with your care!    Below you will find a list of your tests from today's visit.   Labs  Recent Results (from the past 12 hour(s))   CBC with Auto Differential    Collection Time: 07/11/24  2:01 AM   Result Value Ref Range    WBC 11.5 (H) 4.1 - 11.1 K/uL    RBC 4.61 4.10 - 5.70 M/uL    Hemoglobin 14.3 12.1 - 17.0 g/dL    Hematocrit 40.3 36.6 - 50.3 %    MCV 87.4 80.0 - 99.0 FL    MCH 31.0 26.0 - 34.0 PG

## 2025-01-08 ENCOUNTER — HOSPITAL ENCOUNTER (EMERGENCY)
Facility: HOSPITAL | Age: 60
Discharge: HOME | End: 2025-01-09
Attending: EMERGENCY MEDICINE

## 2025-01-08 ENCOUNTER — APPOINTMENT (OUTPATIENT)
Facility: HOSPITAL | Age: 60
End: 2025-01-08

## 2025-01-08 DIAGNOSIS — F10.10 ALCOHOL ABUSE: Primary | ICD-10-CM

## 2025-01-08 LAB
ALBUMIN SERPL-MCNC: 3.6 G/DL (ref 3.5–5)
ALBUMIN/GLOB SERPL: 1.2 (ref 1.1–2.2)
ALP SERPL-CCNC: 42 U/L (ref 45–117)
ALT SERPL-CCNC: 61 U/L (ref 12–78)
AMMONIA PLAS-SCNC: 77 UMOL/L
ANION GAP SERPL CALC-SCNC: 12 MMOL/L (ref 2–12)
AST SERPL W P-5'-P-CCNC: 40 U/L (ref 15–37)
BASOPHILS # BLD: 0.03 K/UL (ref 0–0.1)
BASOPHILS NFR BLD: 0.5 % (ref 0–1)
BILIRUB SERPL-MCNC: 0.7 MG/DL (ref 0.2–1)
BUN SERPL-MCNC: 11 MG/DL (ref 6–20)
BUN/CREAT SERPL: 13 (ref 12–20)
CA-I BLD-MCNC: 8.4 MG/DL (ref 8.5–10.1)
CHLORIDE SERPL-SCNC: 100 MMOL/L (ref 97–108)
CO2 SERPL-SCNC: 29 MMOL/L (ref 21–32)
CREAT SERPL-MCNC: 0.82 MG/DL (ref 0.7–1.3)
DIFFERENTIAL METHOD BLD: ABNORMAL
EOSINOPHIL # BLD: 0 K/UL (ref 0–0.4)
EOSINOPHIL NFR BLD: 0 % (ref 0–7)
ERYTHROCYTE [DISTWIDTH] IN BLOOD BY AUTOMATED COUNT: 12.6 % (ref 11.5–14.5)
ETHANOL SERPL-MCNC: 43 MG/DL (ref 0–0.08)
FLUAV RNA SPEC QL NAA+PROBE: NOT DETECTED
FLUBV RNA SPEC QL NAA+PROBE: NOT DETECTED
GLOBULIN SER CALC-MCNC: 3 G/DL (ref 2–4)
GLUCOSE SERPL-MCNC: 188 MG/DL (ref 65–100)
HCT VFR BLD AUTO: 40.4 % (ref 36.6–50.3)
HGB BLD-MCNC: 14.3 G/DL (ref 12.1–17)
IMM GRANULOCYTES # BLD AUTO: 0.02 K/UL (ref 0–0.04)
IMM GRANULOCYTES NFR BLD AUTO: 0.3 % (ref 0–0.5)
LYMPHOCYTES # BLD: 0.88 K/UL (ref 0.8–3.5)
LYMPHOCYTES NFR BLD: 14.4 % (ref 12–49)
MCH RBC QN AUTO: 30.8 PG (ref 26–34)
MCHC RBC AUTO-ENTMCNC: 35.4 G/DL (ref 30–36.5)
MCV RBC AUTO: 86.9 FL (ref 80–99)
MONOCYTES # BLD: 0.52 K/UL (ref 0–1)
MONOCYTES NFR BLD: 8.5 % (ref 5–13)
NEUTS SEG # BLD: 4.67 K/UL (ref 1.8–8)
NEUTS SEG NFR BLD: 76.3 % (ref 32–75)
NRBC # BLD: 0 K/UL (ref 0–0.01)
NRBC BLD-RTO: 0 PER 100 WBC
PLATELET # BLD AUTO: 217 K/UL (ref 150–400)
PMV BLD AUTO: 9.9 FL (ref 8.9–12.9)
POTASSIUM SERPL-SCNC: 3.2 MMOL/L (ref 3.5–5.1)
PROT SERPL-MCNC: 6.6 G/DL (ref 6.4–8.2)
RBC # BLD AUTO: 4.65 M/UL (ref 4.1–5.7)
SARS-COV-2 RNA RESP QL NAA+PROBE: NOT DETECTED
SODIUM SERPL-SCNC: 141 MMOL/L (ref 136–145)
TROPONIN I SERPL HS-MCNC: 6 NG/L (ref 0–76)
WBC # BLD AUTO: 6.1 K/UL (ref 4.1–11.1)

## 2025-01-08 PROCEDURE — 87636 SARSCOV2 & INF A&B AMP PRB: CPT

## 2025-01-08 PROCEDURE — 99284 EMERGENCY DEPT VISIT MOD MDM: CPT

## 2025-01-08 PROCEDURE — 80053 COMPREHEN METABOLIC PANEL: CPT

## 2025-01-08 PROCEDURE — 93005 ELECTROCARDIOGRAM TRACING: CPT | Performed by: EMERGENCY MEDICINE

## 2025-01-08 PROCEDURE — 82077 ASSAY SPEC XCP UR&BREATH IA: CPT

## 2025-01-08 PROCEDURE — 36415 COLL VENOUS BLD VENIPUNCTURE: CPT

## 2025-01-08 PROCEDURE — 70450 CT HEAD/BRAIN W/O DYE: CPT

## 2025-01-08 PROCEDURE — 84484 ASSAY OF TROPONIN QUANT: CPT

## 2025-01-08 PROCEDURE — 85025 COMPLETE CBC W/AUTO DIFF WBC: CPT

## 2025-01-08 PROCEDURE — 82140 ASSAY OF AMMONIA: CPT

## 2025-01-08 ASSESSMENT — PAIN SCALES - GENERAL
PAINLEVEL_OUTOF10: 0

## 2025-01-08 ASSESSMENT — LIFESTYLE VARIABLES
HOW MANY STANDARD DRINKS CONTAINING ALCOHOL DO YOU HAVE ON A TYPICAL DAY: 7 TO 9
HOW OFTEN DO YOU HAVE A DRINK CONTAINING ALCOHOL: 4 OR MORE TIMES A WEEK

## 2025-01-08 NOTE — ED TRIAGE NOTES
Patient arrives from home for DT's patients last drink was 24hrs ago patient is lethargic and altered

## 2025-01-09 VITALS
TEMPERATURE: 98 F | WEIGHT: 210 LBS | BODY MASS INDEX: 29.4 KG/M2 | OXYGEN SATURATION: 97 % | DIASTOLIC BLOOD PRESSURE: 108 MMHG | HEART RATE: 98 BPM | RESPIRATION RATE: 10 BRPM | HEIGHT: 71 IN | SYSTOLIC BLOOD PRESSURE: 164 MMHG

## 2025-01-09 LAB
EKG ATRIAL RATE: 88 BPM
EKG DIAGNOSIS: NORMAL
EKG P AXIS: 8 DEGREES
EKG P-R INTERVAL: 166 MS
EKG Q-T INTERVAL: 374 MS
EKG QRS DURATION: 90 MS
EKG QTC CALCULATION (BAZETT): 452 MS
EKG R AXIS: -1 DEGREES
EKG T AXIS: 7 DEGREES
EKG VENTRICULAR RATE: 88 BPM

## 2025-01-09 RX ORDER — ONDANSETRON 4 MG/1
4 TABLET, ORALLY DISINTEGRATING ORAL EVERY 8 HOURS PRN
Qty: 20 TABLET | Refills: 0 | Status: SHIPPED | OUTPATIENT
Start: 2025-01-09

## 2025-01-09 RX ORDER — LACTULOSE 10 G/10G
10 SOLUTION ORAL 2 TIMES DAILY
Qty: 10 PACKET | Refills: 0 | Status: SHIPPED | OUTPATIENT
Start: 2025-01-09

## 2025-01-09 NOTE — ED NOTES
Daughter called at this time, requesting update. Update provided at this time. Phone provided to patient for patient to speak with patient.   Daughter informed writer that patient has been vomiting and having diarrhea at home quite frequently here recently.

## 2025-01-09 NOTE — ED PROVIDER NOTES
[facility] EMERGENCY DEPT  EMERGENCY DEPARTMENT HISTORY AND PHYSICAL EXAM      Date: 1/8/2025  Patient Name: Marcin Alejandre  MRN: [id number]  Birthdate 1965  Date of evaluation: 1/8/2025  Provider: Bradley Adkins MD   Note Started: 7:32 PM EST 1/8/25    HISTORY OF PRESENT ILLNESS     Chief Complaint   Patient presents with    Delirium Tremens (DTS)       History Provided By: Patient and EMS    HPI: Marcin Alejandre is a 59 y.o. male presents for evaluation of confusion somnolence.  EMS states they were called for the same patient was found to 60s concern for going under DTs as his last drink was 24 hours ago.  Patient denies any specific somatic complaints at this time.    PAST MEDICAL HISTORY   Past Medical History:  Past Medical History:   Diagnosis Date    Hypertension        Past Surgical History:  No past surgical history on file.    Family History:  No family history on file.    Social History:  Social History     Tobacco Use    Smoking status: Never    Smokeless tobacco: Never   Substance Use Topics    Alcohol use: Yes     Comment: socially    Drug use: Not Currently       Allergies:  No Known Allergies    PCP: Neda Aden MD    Current Meds:   No current facility-administered medications for this encounter.     Current Outpatient Medications   Medication Sig Dispense Refill    lactulose (CEPHULAC) 10 g packet Take 1 packet by mouth 2 times daily 10 packet 0    ondansetron (ZOFRAN-ODT) 4 MG disintegrating tablet Take 1 tablet by mouth every 8 hours as needed for Nausea or Vomiting 20 tablet 0    ketorolac (TORADOL) 10 MG tablet Take 1 tablet by mouth every 6 hours as needed for Pain 20 tablet 0       Social Determinants of Health:   Social Determinants of Health     Tobacco Use: Low Risk  (7/11/2024)    Patient History     Smoking Tobacco Use: Never     Smokeless Tobacco Use: Never     Passive Exposure: Not on file   Alcohol Use: Alcohol Misuse (1/8/2025)    AUDIT-C     Frequency of Alcohol

## 2025-01-09 NOTE — ED NOTES
Pt's linens replaced due to pt tossing and turning. Placed in new gown and new electrodes applied.

## 2025-01-09 NOTE — DISCHARGE INSTRUCTIONS
Thank you for choosing our Emergency [facility] for your care.  It is our privilege to care for you in your time of need.  In the next several days, you may receive a survey via email or mailed to your home about your experience with our team.  We would greatly appreciate you taking a few minutes to complete the survey, as we use this information to learn what we have done well and what we could be doing better. Thank you for trusting us with your care!    Below you will find a list of your tests from today's visit.   Labs and Radiology Studies  Recent Results (from the past 12 hour(s))   CBC with Auto Differential    Collection Time: 01/08/25  5:33 PM   Result Value Ref Range    WBC 6.1 4.1 - 11.1 K/uL    RBC 4.65 4.10 - 5.70 M/uL    Hemoglobin 14.3 12.1 - 17.0 g/dL    Hematocrit 40.4 36.6 - 50.3 %    MCV 86.9 80.0 - 99.0 FL    MCH 30.8 26.0 - 34.0 PG    MCHC 35.4 30.0 - 36.5 g/dL    RDW 12.6 11.5 - 14.5 %    Platelets 217 150 - 400 K/uL    MPV 9.9 8.9 - 12.9 FL    Nucleated RBCs 0.0 0.0  WBC    nRBC 0.00 0.00 - 0.01 K/uL    Neutrophils % 76.3 (H) 32.0 - 75.0 %    Lymphocytes % 14.4 12.0 - 49.0 %    Monocytes % 8.5 5.0 - 13.0 %    Eosinophils % 0.0 0.0 - 7.0 %    Basophils % 0.5 0.0 - 1.0 %    Immature Granulocytes % 0.3 0 - 0.5 %    Neutrophils Absolute 4.67 1.80 - 8.00 K/UL    Lymphocytes Absolute 0.88 0.80 - 3.50 K/UL    Monocytes Absolute 0.52 0.00 - 1.00 K/UL    Eosinophils Absolute 0.00 0.00 - 0.40 K/UL    Basophils Absolute 0.03 0.00 - 0.10 K/UL    Immature Granulocytes Absolute 0.02 0.00 - 0.04 K/UL    Differential Type AUTOMATED     Comprehensive Metabolic Panel    Collection Time: 01/08/25  5:33 PM   Result Value Ref Range    Sodium 141 136 - 145 mmol/L    Potassium 3.2 (L) 3.5 - 5.1 mmol/L    Chloride 100 97 - 108 mmol/L    CO2 29 21 - 32 mmol/L    Anion Gap 12 2 - 12 mmol/L    Glucose 188 (H) 65 - 100 mg/dL    BUN 11 6 - 20 mg/dL    Creatinine 0.82 0.70 - 1.30 mg/dL    BUN/Creatinine Ratio 13

## 2025-01-09 NOTE — ED NOTES
Pt presents soiled in feces at arrival. Incontinence care provided at this time. External urinary catheter applied.

## 2025-01-09 NOTE — ED NOTES
Assumed care of pt at this time. Pt resting comfortably on stomach in stretcher. Pt on continuous cardiac monitoring, pulse ox, and blood pressure monitoring at this time.

## 2025-01-09 NOTE — ED NOTES
Pt pulling himself off the monitor and climbing out of bed. Pt able to be redirected back to bed when asked. Pt able to tell staff the month and name, but still unable to state where he is or why he came in. Provider notified of findings.

## 2025-04-28 ENCOUNTER — OFFICE VISIT (OUTPATIENT)
Age: 60
End: 2025-04-28
Payer: COMMERCIAL

## 2025-04-28 VITALS — WEIGHT: 190 LBS | BODY MASS INDEX: 26.5 KG/M2

## 2025-04-28 DIAGNOSIS — E11.65 TYPE 2 DIABETES MELLITUS WITH HYPERGLYCEMIA, WITHOUT LONG-TERM CURRENT USE OF INSULIN (HCC): Primary | ICD-10-CM

## 2025-04-28 PROCEDURE — G0108 DIAB MANAGE TRN  PER INDIV: HCPCS | Performed by: DIETITIAN, REGISTERED

## 2025-04-28 RX ORDER — LISINOPRIL 20 MG/1
20 TABLET ORAL
COMMUNITY
Start: 2025-04-09 | End: 2025-07-08

## 2025-04-28 RX ORDER — METFORMIN HYDROCHLORIDE 500 MG/1
TABLET, EXTENDED RELEASE ORAL
COMMUNITY

## 2025-04-28 NOTE — PROGRESS NOTES
[facility]  Diabetes Self-Management Education & Support Program    Reason for Referral: Diabetes Education  Referral Source: Neda Aden MD  Services requested: DSMES       ASSESSMENT    From my perspective, the participant would benefit from DSMES specifically related to reducing risks, healthy eating, monitoring, taking medications, physical activity, healthy coping, and problem solving. Will adapt DSMES program to build on participant's strengths in preparedness score as noted in the Diabetes Skills, Confidence, and Preparedness Index.    During the program, we will focus on providing DSMES that specifically addresses participant's interest in reducing risks, healthy eating, monitoring, taking medications, physical activity, healthy coping, and problem solving, as shown by their reported readiness to change.    The participant would be best served by attending weekly group class series.    Diabetes Self-Management Education Follow-up Visit: June 2025, [place] Wednesday Class  500-600 calories at dinner meal: Hello fresh. Has lost 40#.  NFPE: mild loss of protein stores to temporal region and mild loss of buccal fat stores. He reports taking Metformin XR daily. Monitors his FBG ~3x/week. He exercises daily-cardio and walks during lunch. He denies s/sx of hypoglycemia but expressed concern for them going low. Mr. Alejandre was given the Hypoglycemia information sheet from the [employer] and an example of the Healthy plate Template for meal design.   Recommended:  Visit Diabetes.org to learn about the Healthy plate  Begin to add back breakfast and balanced lunch meals  Learn CHO sources/aim for 45-60 g CHO per meal  Monitor BG 2H past largest meal to assess for target  Add Protein shakes to (Premier c 30 g Pro/5 g CHO per shake) to breakfast and lunch       Clinical Presentation  Marcin Alejandre is a 59 y.o. White male referred for diabetes self-management